# Patient Record
Sex: FEMALE | Race: WHITE
[De-identification: names, ages, dates, MRNs, and addresses within clinical notes are randomized per-mention and may not be internally consistent; named-entity substitution may affect disease eponyms.]

---

## 2019-06-29 NOTE — RADIOLOGY REPORT (SQ)
EXAM DESCRIPTION:  U/S OB TRANSVAG W/DOPPLER



COMPLETED DATE/TIME:  6/29/2019 6:51 pm



REASON FOR STUDY:  lower abd pain, ? cyst, heavy vag bleed



COMPARISON:  None.



TECHNIQUE:  Dynamic and static grayscale images acquired of the pelvis via transvaginal approach and 
recorded on PACS. Additional selected color Doppler and spectral images recorded.



LIMITATIONS:  None.



FINDINGS:  UTERUS: The uterus measures 9 x 3.9 x 5.5 cm.  No abnormality seen.

ENDOMETRIAL STRIPE: The endometrium measures 5.3 mm.  No evidence of yolk sac or fetal pole within th
e uterus.

CERVIX:  The cervix measures 3.2 cm.  No nabothian cysts.

RIGHT OVARY AND DOPPLER: The right ovary measures 3.8 x 2.3 x 1.7 cm demonstrating normal echogenicit
y.  Doppler flow to right ovary.

LEFT OVARY AND DOPPLER: The left ovary measures 3.1 x 2.8 x 2.3 cm demonstrating normal echogenicity.
  Doppler flow the left ovary.

FREE FLUID: None noted.

OTHER: No other significant finding.



IMPRESSION:  Given history of positive pregnancy test ,follow-up with serial beta hCGs could be obtai
leonides with follow-up ultrasound within 7 - 14 days.  No evidence of an intrauterine pregnancy.



TECHNICAL DOCUMENTATION:  JOB ID:  [id number]

[id number]

 2011 [employer]- All Rights Reserved                          [id number]



Reading location - IP/workstation name: [email or web address]

## 2019-06-29 NOTE — ER DOCUMENT REPORT
ED General





- General


Chief Complaint: Abdominal Pain


Stated Complaint: ABDOMINAL PAIN


Time Seen by Provider: 06/29/19 17:20


Mode of Arrival: Ambulatory


Information source: Patient


TRAVEL OUTSIDE OF THE [place] IN LAST 30 DAYS: No





- HPI


Patient complains to provider of: Severe pelvic pain and excessive vaginal 

bleeding


Onset: Yesterday


Onset/Duration: Sudden


Quality of pain: Sharp


Severity: Severe


Pain Level: 4


Associated symptoms: denies: Chills, Diarrhea, Fever, Nausea, Vomiting


Exacerbated by: Denies


Relieved by: Denies


Similar symptoms previously: No


Recently seen / treated by doctor: No


Notes: 





19-year-old  female reports that 2 months ago she was pregnant but had 

a miscarriage.  Just started having a.  Yesterday and has been having very heavy

bleeding and severe cramping.  Feeling weak and dizzy.  No fevers or chills.  

Mom worried about ovarian cyst.





- Related Data


Allergies/Adverse Reactions: 


                                        





No Known Allergies Allergy (Unverified 06/29/19 17:18)


   











Past Medical History





- General


Information source: Patient





- Social History


Smoking Status: Unknown if Ever Smoked


Frequency of alcohol use: None


Drug Abuse: None


Family History: Reviewed & Not Pertinent


Patient has suicidal ideation: No


Patient has homicidal ideation: No


Renal/ Medical History: Denies: Hx Peritoneal Dialysis





Review of Systems





- Review of Systems


Notes: 


Constitutional:  No fevers. No chills.





EENT: No eye redness. No eye pain. No ear pain. No sore throat.





Cardiovascular:  No chest pain. No palpitations.





Respiratory: No cough. No shortness of breath. No respiratory distress.





Gastrointestinal: No abdominal pain. No nausea, vomiting, or diarrhea.





Genitourinary: Positive pelvic cramping and bleeding


Musculoskeletal: Atraumatic. No swelling. No deformities.





Skin: No rash or lesions.





Lymphatic: No swollen lymph nodes.





Neurologic: No headache. No syncope.





Psychiatric: No suicidal or homicidal ideation.





Physical Exam





- Vital signs


Vitals: 


                                        











Temp Pulse Resp BP Pulse Ox


 


 98.5 F   72   16   113/71   96 


 


 06/29/19 16:38  06/29/19 16:38  06/29/19 16:38  06/29/19 16:38  06/29/19 16:38














- Notes


Notes: 





General: Well-developed, well-nourished. In no acute distress. Non-toxic 

appearing.





Cardiac: Well-perfused. Regular rate and rhythm. No murmurs, rubs, or gallops. 





Pulmonary: No respiratory distress. No cyanosis. Bilateral lung fiels are clear 

to auscultation.





Abdominal: Non-distended. Non-rigid. Bowels sounds are present in all four 

quadrants. No guarding or rebound.





HEENT: Head is atraumatic. Conjunctivae not reddened. No tearing. PERRL. EOMI. 

Orbits atraumatic. No periorbital swelling or erythema. Oropharynx is without 

erythema, swelling, or exudates.





Neck: Supple. No adenopathy. No meningismus.





Dermatologic: Warm with good turgor. No rash. Atraumatic.





Chest: Atraumatic. No chest wall tenderness to palpation.





Musculoskeletal: Moves all extremities well. No range of motion deficits. no 

muscular or joint tenderness. No paraspinal muscle tenderness. no midline spinal

tenderness or step-off.





Genitourinary: Examination deferred





Neurologic: No gross neurologic deficits.





Psychiatric: Normal mood. 











Course





- Re-evaluation


Re-evalutation: 





06/29/19 18:22


Appropriate work-up ordered from [facility].


06/29/19 20:12


Lab work and ultrasound reviewed.  Patient states it was at least a month ago 

when she was told that she had miscarried.  Today's beta-hCG is a little over 

150.  I certainly would have expected her number to be undetectable by now had 

this been her original pregnancy.  Suspect she may be pregnant again.  Given her

symptoms of heavy bleeding and cramping and dizziness I warned her that this 

could be a sign that she is already miscarrying again.  On the contrary, also we

need to be concerned about a possible ectopic pregnancy if he keeps getting 

higher and no intrauterine pregnancy is seen on ultrasound.  In any event 

patient will return in 2 days for repeat hCG to see if she is miscarrying or if 

this is a new pregnancy that is moving forward.





- Vital Signs


Vital signs: 


                                        











Temp Pulse Resp BP Pulse Ox


 


 98.5 F   72   16   113/71   96 


 


 06/29/19 16:38  06/29/19 16:38  06/29/19 16:38  06/29/19 16:38  06/29/19 16:38














- Laboratory


Result Diagrams: 


                                 06/29/19 17:47





                                 06/29/19 17:47


Laboratory results interpreted by me: 


                                        











  06/29/19 06/29/19 06/29/19





  17:47 17:47 18:43


 


Serum HCG, Qual  POSITIVE H  


 


Beta HCG, Quant   115.90 H 


 


Urine Blood    LARGE H


 


Ur Leukocyte Esterase    MODERATE H














Discharge





- Discharge


Clinical Impression: 


 Positive pregnancy test, Vaginal bleeding, Abdominal cramping





Condition: Good


Disposition: HOME, SELF-CARE


Additional Instructions: 


Your pregnancy hormone level is a little over 150.  If you truly miscarried 

about a month ago your numbers should be quite a bit lower than this.  We 

suspect that you may have gotten pregnant again.  With all of this heavy 

cramping and bleeding, you may BE HAVING another miscarriage.  We do not see any

evidence of pregnancy on the ultrasound, however it is extremely early and very 

unlikely we would have YOU see any sign of pregnancy at this point in time.  

What we must do is have you return in 2 days to see if your hormone levels are 

increasing or decreasing.  As long as they are continuing to increase you most 

likely have a DEVELOPING pregnancy.  Over time we will need to determine if this

is an intrauterine healthy pregnancy or an ectopic nonviable pregnancy.  We will

figure this out in time.


[facility] 


[facility] - Follow up as needed

## 2019-06-29 NOTE — ER DOCUMENT REPORT
ED Medical Screen (RME)





- General


Chief Complaint: Abdominal Pain


Stated Complaint: ABDOMINAL PAIN


Time Seen by Provider: 06/29/19 17:20


Mode of Arrival: Ambulatory


Information source: Patient


Notes: 





Patient presents emergency department with reports of very heavy vaginal 

bleeding for the past 2 days.  Reports she went through a box of tampons in 1 

day.  Reports before that she did not have her menses for 2 months.  Complains 

of lower abdominal pain.  Reports her mom thinks she has an ovarian cyst would 

like to be checked out by ultrasound for that.  Denies fever vomiting diarrhea.








I have greeted and performed a rapid initial assessment of this patient.  A 

comprehensive ED assessment and evaluation of the patient, analysis of test 

results and completion of the medical decision making process will be conducted 

by additional ED providers.





Dictation of this chart was performed using voice recognition software; 

therefore, there may be some unintended grammatical errors.


TRAVEL OUTSIDE OF THE [place] IN LAST 30 DAYS: No





- Related Data


Allergies/Adverse Reactions: 


                                        





No Known Allergies Allergy (Unverified 06/29/19 17:18)


   











Physical Exam





- Vital signs


Vitals: 





                                        











Temp Pulse Resp BP Pulse Ox


 


 98.5 F   72   16   113/71   96 


 


 06/29/19 16:38  06/29/19 16:38  06/29/19 16:38  06/29/19 16:38  06/29/19 16:38














Course





- Vital Signs


Vital signs: 





                                        











Temp Pulse Resp BP Pulse Ox


 


 98.5 F   72   16   113/71   96 


 


 06/29/19 16:38  06/29/19 16:38  06/29/19 16:38  06/29/19 16:38  06/29/19 16:38

## 2019-08-19 NOTE — RADIOLOGY REPORT (SQ)
EXAM DESCRIPTION: 



XR SHOULDER 2 OR MORE VIEWS



COMPLETED DATE/TME:  08/19/2019 00:00



CLINICAL HISTORY: 



19 years, Female, pain with movement



COMPARISON:

None.



NUMBER OF VIEWS:

3



TECHNIQUE:

3 view left shoulder



LIMITATIONS:

None.



FINDINGS:



Negative for fracture or dislocation. Soft tissues are

unremarkable



IMPRESSION:



Negative exam

 



copyright 2011 [employer]- All Rights Reserved

## 2020-07-16 ENCOUNTER — HOSPITAL ENCOUNTER (OUTPATIENT)
Dept: HOSPITAL 62 - RAD | Age: 20
End: 2020-07-16
Attending: MIDWIFE
Payer: SELF-PAY

## 2020-07-16 DIAGNOSIS — Z34.82: Primary | ICD-10-CM

## 2020-07-16 DIAGNOSIS — Z3A.19: ICD-10-CM

## 2020-07-16 PROCEDURE — 76805 OB US >/= 14 WKS SNGL FETUS: CPT

## 2020-07-16 NOTE — RADIOLOGY REPORT (SQ)
EXAM DESCRIPTION:  U/S OB 14+ TRNABD 1GES W/O DOP



IMAGES COMPLETED DATE/TIME:  7/16/2020 2:25 pm



REASON FOR STUDY:  Z34.82 ENCOUNTER FOR SUPRVSN OF NORMAL PREGNANCY, SECOND TRIMESTER Z34.82  ENCOUNT
ER FOR SUPRVSN OF NORMAL PREGNANCY, SECOND TRI



COMPARISON:  None.



TECHNIQUE:  Static and Dynamic grayscale imaging performed of gravid uterus using transabdominal appr
oach.  Additional selected color Doppler and spectral images recorded.  All stored on PACS.



LIMITATIONS:  None.



FINDINGS:  FETUSES SEEN:1

EGA: 19 week 4 day.  Calculated using BPD,FL,HC,AC documented on images. Clinical dates 18 week 2 day
.

DAREN: 12/6/2020.

EFW: 293 g.

PERCENTILE: Not applicable. Fetus less than or equal to 20 weeks gestation.

LVP: 2.8 x 3.8 cm.

PLACENTA: Posterior.

FETAL PRESENTATION: Breech.

FETAL ANATOMY:

FETAL HEART RATE: 152 beats per minute.

FOUR CHAMBER HEART: Visualized.

THREE VESSEL CORD: Yes.

CORD INSERTION: Visualized.

KIDNEYS AND BLADDER: Visualized. Appear normal.

STOMACH: Visualized. Appears normal.

SPINE: Normal as visualized.

BRAIN AND LATERAL VENTRICLES: Visualized. Appear normal.

OTHER: No other significant finding.

MATERNAL ADNEXA: Maternal ovaries not visualized.

CERVICAL LENGTH: 4.3 cm.   Closed.

OTHER: No other significant finding.



IMPRESSION:  LIVING INTRAUTERINE PREGNANCY.

ESTIMATED GESTATIONAL AGE 19 WEEK 4 DAY.

NO VISUALIZED ANOMALIES.

Trimester of pregnancy: Second trimester - 13 weeks 1 day to 27 weeks 6 days.



TECHNICAL DOCUMENTATION:  JOB ID:  [id number]

 2011 [employer]- All Rights Reserved



Reading location - IP/workstation name: [email or web address]